# Patient Record
Sex: FEMALE | Race: WHITE | NOT HISPANIC OR LATINO | Employment: OTHER | URBAN - METROPOLITAN AREA
[De-identification: names, ages, dates, MRNs, and addresses within clinical notes are randomized per-mention and may not be internally consistent; named-entity substitution may affect disease eponyms.]

---

## 2021-05-26 ENCOUNTER — RECORDS - HEALTHEAST (OUTPATIENT)
Dept: ADMINISTRATIVE | Facility: CLINIC | Age: 71
End: 2021-05-26

## 2024-08-05 ENCOUNTER — HOSPITAL ENCOUNTER (EMERGENCY)
Facility: HOSPITAL | Age: 74
Discharge: HOME | End: 2024-08-05
Attending: STUDENT IN AN ORGANIZED HEALTH CARE EDUCATION/TRAINING PROGRAM | Admitting: STUDENT IN AN ORGANIZED HEALTH CARE EDUCATION/TRAINING PROGRAM
Payer: MEDICARE

## 2024-08-05 VITALS
OXYGEN SATURATION: 93 % | WEIGHT: 168.7 LBS | TEMPERATURE: 96.9 F | SYSTOLIC BLOOD PRESSURE: 109 MMHG | HEART RATE: 77 BPM | RESPIRATION RATE: 17 BRPM | DIASTOLIC BLOOD PRESSURE: 62 MMHG

## 2024-08-05 DIAGNOSIS — F32.A DEPRESSION, UNSPECIFIED DEPRESSION TYPE: ICD-10-CM

## 2024-08-05 PROCEDURE — 99282 EMERGENCY DEPT VISIT SF MDM: CPT

## 2024-08-05 ASSESSMENT — COLUMBIA-SUICIDE SEVERITY RATING SCALE - C-SSRS
1. IN THE PAST MONTH, HAVE YOU WISHED YOU WERE DEAD OR WISHED YOU COULD GO TO SLEEP AND NOT WAKE UP?: YES
2. HAVE YOU ACTUALLY HAD ANY THOUGHTS OF KILLING YOURSELF IN THE PAST MONTH?: NO
6. HAVE YOU EVER DONE ANYTHING, STARTED TO DO ANYTHING, OR PREPARED TO DO ANYTHING TO END YOUR LIFE?: NO

## 2024-08-05 NOTE — ED TRIAGE NOTES
Multiple complaints. Visiting from [facility]. In triage with family members. Reports drinking heavily, depression, failure to thrive, recent death in family, anxiety. Very Difficult to complete triage family talking over patient      Triage Assessment (Adult)       Row Name 08/05/24 0254          Triage Assessment    Airway WDL WDL        Respiratory WDL    Respiratory WDL WDL        Skin Circulation/Temperature WDL    Skin Circulation/Temperature WDL WDL        Cardiac WDL    Cardiac WDL WDL        Peripheral/Neurovascular WDL    Peripheral Neurovascular WDL WDL        Cognitive/Neuro/Behavioral WDL    Cognitive/Neuro/Behavioral WDL WDL

## 2024-08-05 NOTE — DISCHARGE INSTRUCTIONS
As we discussed, it will be very important you to follow-up with your primary care team as well as your  once again back to [place].    Return for any worsening suicidal ideations, worsening depression or any other concerning symptoms.

## 2024-08-05 NOTE — ED PROVIDER NOTES
"  Emergency Department Encounter         FINAL IMPRESSION:  Depression        ED COURSE AND MEDICAL DECISION MAKING     Patient is a 73-year-old with a history of chronic pain syndrome, depression, anxiety, here with worsening depression and passive suicidal statements and thoughts.  Family brought patient in due to the fact that she was stating multiple times over the last few days that \"I wish I was with [facility].\"  Patient reports that she recently lost her grandson 6 months ago and has been had a difficult time tolerating the loss as well as the grief.  Patient over the last couple days has had increasing depression and passive suicidality.  Here visiting from [place].    Family stated that tonight patient made too many statements and made them concerned about her safety at 1 point.  Patient had been drinking tonight as well.  Denies any chronic or worsening alcohol use at home.  States she had 4 drinks tonight.  No other medical complaints including chest pain, headache, falls, abdominal pain, nausea or vomiting.    Patient reports that she would never commit suicide.  States she has too much to live for, 9 grandchildren as well as 4 great-grandchildren.  She has multiple for members in [place].  States that she saw her primary care for depression 2 months ago and was started on \"a medication.\"  However at that point was told to follow-up with therapy however she never did not.    Here she is tearful and overall appropriate alert and oriented.  States that she would never hurt herself or others.  States that \"I know I have to get help I just am having a hard time.\"  Family seems quite supportive at bedside.  I do not think at this point she would need emergent psychiatric stabilization or admission.  She states multiple times during the interview that she would never hurt herself however at times her depression feels so bad that she does not want to be around.    I did offer crisis evaluation here tonight " however patient as well as family declined as they would rather follow-up in [facility].       Additional ED Course Timeline:  2:59 AM I met with the patient, obtained history, performed an initial exam, and discussed options and plan for diagnostics and treatment here in the ED.               Medical Decision Making  Obtained supplemental history:Supplemental history obtained?: Documented in chart and Family Member/Significant Other  Reviewed external records: External records reviewed?: Documented in chart  Care impacted by chronic illness:Mental Health  Care significantly affected by social determinants of health:Access to Medical Care and Alcohol Abuse and/or Recreational Drug Use  Did you consider but not order tests?: Work up considered but not performed and documented in chart, if applicable  Did you interpret images independently?: Independent interpretation of ECG and images noted in documentation, when applicable.  Consultation discussion with other provider:Did you involve another provider (consultant, , pharmacy, etc.)?: No  Discharge. No recommendations on prescription strength medication(s). See documentation for any additional details.                Critical Care     Performed by: Simon Goss or    Authorized by: Simon Goss  Total critical care time:  minutes  Critical care was necessary to treat or prevent imminent or life-threatening deterioration of the following conditions:   Critical care was time spent personally by me on the following activities: development of treatment plan with patient or surrogate, discussions with consultants, examination of patient, evaluation of patient's response to treatment, obtaining history from patient or surrogate, ordering and performing treatments and interventions, ordering and review of laboratory studies, ordering and review of radiographic studies, re-evaluation of patient's condition and monitoring for potential decompensation.  Critical care time  "was exclusive of separately billable procedures and treating other patients.'    At the conclusion of the encounter I discussed the results of all the tests and the disposition. The questions were answered. The patient or family acknowledged understanding and was agreeable with the care plan.                  MEDICATIONS GIVEN IN THE EMERGENCY DEPARTMENT:  Medications - No data to display    NEW PRESCRIPTIONS STARTED AT TODAY'S ED VISIT:  New Prescriptions    No medications on file       HPI     Patient information obtained from: The patient and his family.    Use of : N/A     Vernell Gomez is a 73 year old female with a pertinent history of anxiety, depression, chronic back pain, who presents to this ED via walk-in for evaluation of multiple complaints.    The patient reports she drank 4 cans of socorro last night hanging out with her family. She is visiting from [place]. Her family was concerned about her mental health given her grandson passed away recently and the patient stated tonight \"I'd be better off with [facility]\". The patient acknowledges she is depressed but does not endorse suicidal ideation at this time. She has seen her providers at [place] for her depression and was prescribed anti anxiety and antidepressants. She does live with her sister back in [place] and does call her cousin at times. The patient has a history of chronic back pain and has seen her providers (oncologist and hematologist) for recurrent bruises on her extremities.     Otherwise, the patient denied having chest pain, shortness of breath, hematemesis, and any other medical complaints at this time.        REVIEW OF SYSTEMS:  See HPI          MEDICAL HISTORY     No past medical history on file.    No past surgical history on file.         No current outpatient medications on file.          PHYSICAL EXAM     BP 93/56   Pulse 74   Temp 96.9  F (36.1  C)   Resp 17   Wt 76.5 kg (168 lb 11.2 oz)   SpO2 " 94%       PHYSICAL EXAM:     General: Patient appears well, nontoxic, comfortable  HEENT: Moist mucous membranes,  No head trauma.    Cardiovascular: Normal rate, normal rhythm, no extremity edema.  No appreciable murmur.  Respiratory: No signs of respiratory distress, lungs are clear to auscultation bilaterally with no wheezes rhonchi or rales.  Abdominal: Soft, nontender, nondistended, no palpable masses, no guarding, no rebound  Musculoskeletal: Full range of motion of joints, no deformities appreciated.  Neurological: Alert and oriented, grossly neurologically intact.  Psychological: Tearful, depressed  Integument: No rashes appreciated          RESULTS       Labs Ordered and Resulted from Time of ED Arrival to Time of ED Departure - No data to display    No orders to display                     PROCEDURES:  Procedures:  Procedures       IZion am serving as a scribe to document services personally performed by Simon Goss DO, based on my observations and the provider's statements to me.  ISimon DO, attest that Zion Hays is acting in a scribe capacity, has observed my performance of the services and has documented them in accordance with my direction.    Simon Goss DO  Emergency Medicine  [facility] EMERGENCY DEPARTMENT       Simon Goss DO  08/05/24 0341